# Patient Record
Sex: MALE | Race: WHITE | URBAN - METROPOLITAN AREA
[De-identification: names, ages, dates, MRNs, and addresses within clinical notes are randomized per-mention and may not be internally consistent; named-entity substitution may affect disease eponyms.]

---

## 2019-03-25 ENCOUNTER — TRANSFERRED RECORDS (OUTPATIENT)
Dept: HEALTH INFORMATION MANAGEMENT | Facility: CLINIC | Age: 59
End: 2019-03-25

## 2019-03-28 ENCOUNTER — MEDICAL CORRESPONDENCE (OUTPATIENT)
Dept: HEALTH INFORMATION MANAGEMENT | Facility: CLINIC | Age: 59
End: 2019-03-28

## 2019-04-09 ENCOUNTER — DOCUMENTATION ONLY (OUTPATIENT)
Dept: CARE COORDINATION | Facility: CLINIC | Age: 59
End: 2019-04-09

## 2019-04-17 NOTE — TELEPHONE ENCOUNTER
RECORDS RECEIVED FROM: PAINFUL L MARCELLO PER MANDIE, REFERRED BY DR LUDWIG @ [facility] IN [place]   DATE RECEIVED: Apr 25, 2019    NOTES STATUS DETAILS   OFFICE NOTE from referring provider Care Everywhere Dr. Ludwig 3/25/19...   OFFICE NOTE from other specialist N/A    DISCHARGE SUMMARY from hospital N/A    DISCHARGE REPORT from the ER N/A    OPERATIVE REPORT In process L MARCELLO 2001 [facility]  Revision L MARCELLO 2004   MEDICATION LIST Care Everywhere    IMPLANT RECORD/STICKER In process    LABS     CBC/DIFF N/A    CULTURES N/A    INJECTIONS DONE IN RADIOLOGY N/A    MRI N/A    CT SCAN Received 9/18/18...    XRAYS (IMAGES & REPORTS) Received 3/25/19   TUMOR     PATHOLOGY  Slides & report N/A      04/17/19   11:02 AM  Faxed request for CT to [phone]  11:09 AM faxed request for XR from [facility].   11:13 AM Faxed request to [facility] for records    04/19/19   8:33 AM  Spoke with medical records at [facility], the pt's CT was sent 4/18 via [email or web address]    04/22/19   1:31 PM  Disc arrived, will be uploaded.     04/23/19   3:56 PM  Emailed [facility] for records    04/24/19   4:16 PM  [facility] refusing to send records without signed release. Put MARTHA for patient to sign in Brigette's mailbox.

## 2019-04-25 ENCOUNTER — OFFICE VISIT (OUTPATIENT)
Dept: ORTHOPEDICS | Facility: CLINIC | Age: 59
End: 2019-04-25
Payer: MEDICARE

## 2019-04-25 ENCOUNTER — PRE VISIT (OUTPATIENT)
Dept: ORTHOPEDICS | Facility: CLINIC | Age: 59
End: 2019-04-25

## 2019-04-25 VITALS — HEIGHT: 68 IN | WEIGHT: 210.5 LBS | BODY MASS INDEX: 31.9 KG/M2

## 2019-04-25 DIAGNOSIS — M25.552 HIP PAIN, LEFT: ICD-10-CM

## 2019-04-25 DIAGNOSIS — M25.552 HIP PAIN, LEFT: Primary | ICD-10-CM

## 2019-04-25 LAB
CRP SERPL-MCNC: <2.9 MG/L (ref 0–8)
ERYTHROCYTE [SEDIMENTATION RATE] IN BLOOD BY WESTERGREN METHOD: 6 MM/H (ref 0–20)

## 2019-04-25 RX ORDER — PREDNISONE 20 MG/1
40 TABLET ORAL
COMMUNITY
Start: 2019-04-23 | End: 2019-04-28

## 2019-04-25 RX ORDER — ALBUTEROL SULFATE 90 UG/1
2 AEROSOL, METERED RESPIRATORY (INHALATION)
COMMUNITY
Start: 2019-03-20

## 2019-04-25 RX ORDER — ASPIRIN 81 MG/1
81 TABLET ORAL
COMMUNITY
Start: 2015-01-28

## 2019-04-25 RX ORDER — TAMSULOSIN HYDROCHLORIDE 0.4 MG/1
0.4 CAPSULE ORAL
COMMUNITY
Start: 2018-09-10

## 2019-04-25 RX ORDER — AZITHROMYCIN 250 MG/1
TABLET, FILM COATED ORAL
COMMUNITY
Start: 2019-04-23 | End: 2019-04-28

## 2019-04-25 RX ORDER — SILDENAFIL CITRATE 20 MG/1
TABLET ORAL
COMMUNITY
Start: 2018-09-10

## 2019-04-25 ASSESSMENT — ENCOUNTER SYMPTOMS
NECK MASS: 0
COUGH: 1
HEMOPTYSIS: 0
WHEEZING: 1
DYSURIA: 0
POSTURAL DYSPNEA: 1
JOINT SWELLING: 0
FLANK PAIN: 0
DYSPNEA ON EXERTION: 0
PARALYSIS: 0
SEIZURES: 0
BACK PAIN: 1
NECK PAIN: 1
MUSCLE CRAMPS: 1
MYALGIAS: 1
LOSS OF CONSCIOUSNESS: 0
DIFFICULTY URINATING: 1
HOARSE VOICE: 0
DISTURBANCES IN COORDINATION: 1
STIFFNESS: 1
SINUS CONGESTION: 1
NUMBNESS: 1
MEMORY LOSS: 0
SPEECH CHANGE: 0
SMELL DISTURBANCE: 0
COUGH DISTURBING SLEEP: 1
MUSCLE WEAKNESS: 1
HEMATURIA: 0
HEADACHES: 1
TROUBLE SWALLOWING: 0
SPUTUM PRODUCTION: 1
ARTHRALGIAS: 1
TASTE DISTURBANCE: 0
TREMORS: 0
DIZZINESS: 1
SNORES LOUDLY: 0
SHORTNESS OF BREATH: 0
SORE THROAT: 0
WEAKNESS: 0
TINGLING: 1
SINUS PAIN: 0

## 2019-04-25 ASSESSMENT — HOOS S4: HOW SEVERE IS YOUR HIP JOINT STIFFNESS AFTER FIRST WAKENING IN THE MORNING?: SEVERE

## 2019-04-25 ASSESSMENT — ACTIVITIES OF DAILY LIVING (ADL)
ADL_SUM: 42
ADL_MEAN: 2.47
ADL_SUBSCALE_SCORE: 38.23

## 2019-04-25 ASSESSMENT — MIFFLIN-ST. JEOR: SCORE: 1726.38

## 2019-04-25 NOTE — PROGRESS NOTES
Attending MD (Dr. Ziggy Chacon) Attestation :  This patient was seen and evaluated by me including a history, exam, and interpretation of all imaging and/or lab data.  Either a training physician (resident/fellow), who also saw the patient, or scribe has documented the visit in the attached note.    MD Matthew Black Family Professor  Oncology and Adult Reconstructive Surgery  Dept Orthopaedic Surgery, [facility]  [phone] office, [phone] pager  [email or web address], Orthopaedic Oncology Surgery Consultation  by Ziggy Chacon M.D.    Ricardo Feng MRN# [id number]   Age: 59 year old YOB: 1960     Requesting physician: Jasmeet Jones  No primary care provider on file.       Background history:  DX:  1. Left hip osteoarthritis  2. Right hip osteoarthritis    TREATMENTS:  1. 2001, Left Total Hip Arthroplasty, (Dr. Johnston), [facility]  2. 2002, Right Total Hip Arthroplasty, (Dr. Johnston), [facility]  3. 2003, Revision Left Total Hip arthroplasty, [facility]  4. 2005, Revision Right Total Hip arthroplasty, [facility]  5. 9/29/2010, posterior C7-T1 decompression and fusion, (Tim Fuchs MD), [facility]  6. 9/26/2011, left L3-L4 decompressive and von-laminectomy and discectomy, (Tim Fuchs MD), [facility]  7. 7/24/2014, C4-C5 ACDF, (Tim Fuchs MD), [facility]             History of Present Illness:   59 year old male who presents to clinic today with left hip pain.  Patient notes that he has had revision total hip arthroplasty on the left side.  He did well ever since 2003 up until roughly a year ago when he started noticing pain.  He has pain in the groin.  This pain feels like the hip slipping on the place.  He is no longer able to do the activities that she would like to do because of the hip pain.  Notices pain at night occasionally.  Denies any numbness and tingling.  Pain does not radiate below his knee.  He is instant having  "revision surgery at this time.  Reports weakness and slight decrease in sensation below his nipple line secondary to spinal cord injury in 2011.  Smokes half pack per day but is planning on quitting.  Nondiabetic, no history of DVT, no anticoagulant medication, BMI is 32.5.  He works part-time at a [employer].  Is also associated disability for spinal cord injury.    Current symptoms:  Problem: Left hip pain  Onset and duration: 1 year  Awakens from sleep due to sx's:  Yes  Precipitating Injury:  Yes    Other joints or sites painful:  Yes  Fever: Yes  Appetite change or weight loss: Yes         Physical Exam:     EXAMINATION pertinent findings:   VITAL SIGNS: Height 1.715 m (5' 7.5\"), weight 95.5 kg (210 lb 8 oz).  Body mass index is 32.48 kg/m .  RESP: non labored breathing   ABD: benign   SKIN: grossly normal   LYMPHATIC: grossly normal   NEURO: grossly normal   VASCULAR: satisfactory perfusion of all extremities   MUSCULOSKELETAL:   Exam focused in the left lower extremity.  Palpable dorsalis pedis.  Present sensation but patient reports that it is decreased.  Fires EHL, FHL, ankle flexion and plantar flexion 5 out of 5 strength.  His prior incisions well-healed clean dry and intact.  Pain with internal and external rotation of the hip.  Hip flexion 90 degrees with minimal discomfort.             Data:   All laboratory data reviewed  All imaging studies reviewed by me  AP of the pelvis is reviewed.  Implants do not show signs of loosening.  There is eccentric wear on the left polyethylene and possible eccentric wear on the right polyethylene.  No new fracture or dislocation seen          Assessment and Plan:   Assessment:  59-year-old male with history of a revision left and right total hip arthroplasties, presents clinic today with left hip pain in his subjective feeling of instability.  We discussed with the patient that the eccentric wear of the polyethylene should not be contributing to symptoms she is describing.  " We discussed that would like to get some blood work to determine if there is an infection and/or metal debris, which could be causing symptoms.  We will also order an MRI to look for fluid collection.  If there is a fluid collection we will aspirate.     Plan:  -Blood work: ESR, CRP, cobalt and chromium levels  -MRI of the left hip  -We will follow-up with the patient's regarding his test results      Attending MD (Dr. Ziggy Chacon) Attestation :  This patient was seen and evaluated by me including a history, exam, and interpretation of all imaging and/or lab data.  Either a training physician (resident/fellow), who also saw the patient, or scribe has documented the clinic visit in the attached note.    Ziggy Chacon MD  [facility] Family Professor  Oncology and Adult Reconstructive Surgery  Dept Orthopaedic Surgery, [facility]  [phone] office, [phone] pager  [email or web address]          DATA for DOCUMENTATION:         Past Medical History:     Patient Active Problem List   Diagnosis     BPH (benign prostatic hyperplasia)     Cervical stenosis of spine     Erectile dysfunction     Fixation hardware in spine     Status post lumbar discectomy     Hyperlipidemia     Impaired fasting blood sugar     Neurogenic bladder     Prostate cancer screening     S/P cervical spinal fusion     Sinusitis, chronic     Past Medical History:   Diagnosis Date     Arthritis      Back injury      Chronic osteoarthritis      Degenerative joint disease      Hearing problem      Neck injuries        Also see scanned health assessment forms.       Past Surgical History:     Past Surgical History:   Procedure Laterality Date     BACK SURGERY       C HAND/FINGER SURGERY UNLISTED       C PELVIS/HIP JOINT SURGERY UNLISTED       HC SACROPLASTY       NECK SURGERY              Social History:     Social History     Socioeconomic History     Marital status:      Spouse name: Not on file     Number of children: Not on file      Years of education: Not on file     Highest education level: Not on file   Occupational History     Not on file   Social Needs     Financial resource strain: Not on file     Food insecurity:     Worry: Not on file     Inability: Not on file     Transportation needs:     Medical: Not on file     Non-medical: Not on file   Tobacco Use     Smoking status: Light Tobacco Smoker     Packs/day: 0.50     Years: 40.00     Pack years: 20.00     Types: Cigarettes     Start date: 1/1/1980     Smokeless tobacco: Never Used   Substance and Sexual Activity     Alcohol use: Yes     Drug use: Not Currently     Types: Marijuana     Sexual activity: Yes     Partners: Female     Birth control/protection: None   Lifestyle     Physical activity:     Days per week: Not on file     Minutes per session: Not on file     Stress: Not on file   Relationships     Social connections:     Talks on phone: Not on file     Gets together: Not on file     Attends Restorationist service: Not on file     Active member of club or organization: Not on file     Attends meetings of clubs or organizations: Not on file     Relationship status: Not on file     Intimate partner violence:     Fear of current or ex partner: Not on file     Emotionally abused: Not on file     Physically abused: Not on file     Forced sexual activity: Not on file   Other Topics Concern     Not on file   Social History Narrative     Not on file            Family History:       Family History   Problem Relation Age of Onset     Prostate Cancer Father             Medications:     Current Outpatient Medications   Medication Sig     albuterol (VENTOLIN HFA) 108 (90 Base) MCG/ACT inhaler Inhale 2 puffs into the lungs     ALBUTEROL SULFATE HFA IN      aspirin 81 MG EC tablet Take 81 mg by mouth     azithromycin (ZITHROMAX) 250 MG tablet      predniSONE (DELTASONE) 20 MG tablet Take 40 mg by mouth.     sildenafil (REVATIO) 20 MG tablet 3-5 tablets 1 hour prior to sexual activity     tamsulosin  (FLOMAX) 0.4 MG capsule Take 0.4 mg by mouth     No current facility-administered medications for this visit.               Review of Systems:   A comprehensive 10 point review of systems (constitutional, ENT, cardiac, peripheral vascular, lymphatic, respiratory, GI, , Musculoskeletal, skin, Neurological) was performed and found to be negative except as described in this note.     See intake form completed by patient

## 2019-04-25 NOTE — NURSING NOTE
"Chief Complaint   Patient presents with     Consult     Pt. states that he is here today for Painful Left Total Hip Replacement.        59 year old  1960    Ht 1.715 m (5' 7.5\")   Wt 95.5 kg (210 lb 8 oz)   BMI 32.48 kg/m          Date/Surgery/Surgeon/Hospital:  1. 2001, Left Total Hip Arthroplasty, [facility]  2. 2002, Right Total Hip Arthroplasty, [facility]  3. 2003, Revision Left Total Hip, [facility]  4. 2005, Revision Right Total Hip [facility]         [facility] - [place], [place] - [address]    Allergies   Allergen Reactions     Hydrocodone Hives and Itching     Hydrocodone-Acetaminophen Hives and Itching     Morphine Nausea and Vomiting     Other reaction(s): GI intolerance       Current Outpatient Medications   Medication     albuterol (VENTOLIN HFA) 108 (90 Base) MCG/ACT inhaler     ALBUTEROL SULFATE HFA IN     aspirin 81 MG EC tablet     azithromycin (ZITHROMAX) 250 MG tablet     predniSONE (DELTASONE) 20 MG tablet     sildenafil (REVATIO) 20 MG tablet     tamsulosin (FLOMAX) 0.4 MG capsule     No current facility-administered medications for this visit.          Questionnaires:    HOOS Hip Dysfunction & Osteoarthritis Outcome Questionnaire    Hip Dysfunction & Osteoarthritis Outcome Score (HOOS), English Version LK 2.0 (Griselda CANO, Shell E, Dalton MONK, 2003) 4/25/2019   S1. Do you feel grinding, hear clicking or any other type of noise from your hip? Always   S2. Difficulties spreading legs wide apart Moderate   S3. Difficulties to stride out when walking Severe   S4. How severe is your hip joint stiffness after first wakening in the morning? Severe   S5. How severe is your hip stiffness after sitting, lying or resting LATER IN THE DAY? Severe   Symptom Count 5   Symptom Sum 15   Symptom Mean 3   Symptom Subscale Score 25   P1. How often is your hip painful? Always   P2. Straightening your hip fully None   P3. Bending your hip FULLY Extreme   P4. Walking on a flat surface Moderate   P5. Going up or down stairs " Extreme   P6. At night while in bed Moderate   P7. Sitting or lying Mild   P8. Standing upright None   P9. Walking on a hard surface (asphalt, concrete, etc.) Moderate   P10. Walking on an uneven surface Extreme   Pain Count 10   Pain Sum 23   Pain Mean 2.3   Pain Subscale Score 42.5   A1. Descending stairs Extreme   A2. Ascending stairs Extreme   A3. Rising from sitting Severe   A4. Standing None   A5. Bending to the floor/ an object Extreme   A6. Walking on a flat surface Moderate   A7. Getting in/out of car Severe   A8. Going shopping Moderate   A9. Putting on socks/stockings Extreme   A10. Rising from bed Moderate   A11. Taking off socks/stockings Extreme   A12. Lying in bed (turning over, maintaining hip position) Severe   A13. Getting in/out of bed Moderate   A14. Sitting None   A15. Getting on/off toilet Mild   A16. Heavy domestic duties (moving heavy boxes, scrubbing floors, etc.) Extreme   A17. Light domestic duties (cooking, dusting, etc.) None   ADL Count 17   ADL Sum 42   ADL Mean 2.47   ADL Subscale Score 38.23   SP1. Squatting Extreme   SP2. Running Extreme   SP3. Twisting/pivoting on loaded leg Extreme   SP4. Walking on uneven surface Extreme   Sports/Rec Count 4   Sports/Rec Sum 16   Sports/Rec Mean 4   Sports/Rec Subscale Score 0   Q1. How often are you aware of your hip problem? Constantly   Q2. Have you modified you life style to avoid activities potentially damaging to your hip? Totally   Q3. How much are you troubled with lack of confidence in your hip? Extremely   Q4. In general, how much difficulty do you have with your hip? Extreme   QOL Count 4   QOL Sum 16   QOL Mean 4   Quality of Life Subscale Score 0              KOOS Knee Survey Assessment    No flowsheet data found.           Promis 10 Assessment    PROMIS 10 4/25/2019   In general, would you say your health is: Very good   In general, would you say your quality of life is: Excellent   In general, how would you rate your  physical health? Very good   In general, how would you rate your mental health, including your mood and your ability to think? Excellent   In general, how would you rate your satisfaction with your social activities and relationships? Excellent   In general, please rate how well you carry out your usual social activities and roles Very good   To what extent are you able to carry out your everyday physical activities such as walking, climbing stairs, carrying groceries, or moving a chair? Moderately   How often have you been bothered by emotional problems such as feeling anxious, depressed or irritable? Rarely   How would you rate your fatigue on average? Mild   How would you rate your pain on average?   0 = No Pain  to  10 = Worst Imaginable Pain 8   In general, would you say your health is: 4   In general, would you say your quality of life is: 5   In general, how would you rate your physical health? 4   In general, how would you rate your mental health, including your mood and your ability to think? 5   In general, how would you rate your satisfaction with your social activities and relationships? 5   In general, please rate how well you carry out your usual social activities and roles. (This includes activities at home, at work and in your community, and responsibilities as a parent, child, spouse, employee, friend, etc.) 4   To what extent are you able to carry out your everyday physical activities such as walking, climbing stairs, carrying groceries, or moving a chair? 3   In the past 7 days, how often have you been bothered by emotional problems such as feeling anxious, depressed, or irritable? 2   In the past 7 days, how would you rate your fatigue on average? 2   In the past 7 days, how would you rate your pain on average, where 0 means no pain, and 10 means worst imaginable pain? 8   Global Mental Health Score 19   Global Physical Health Score 13   PROMIS TOTAL - SUBSCORES 32   Some recent data might be  hidden              Ortho [place] Knee Questionnaire    No flowsheet data found.

## 2019-04-25 NOTE — LETTER
4/25/2019       RE: Ricardo Feng  [address] [facility] [address]     Dear Colleague,    Thank you for referring your patient, Ricardo Feng, to the [facility]. Please see a copy of my visit note below.        [facility], Orthopaedic Oncology Surgery Consultation  by Ziggy Chacon M.D.    Ricardo Feng MRN# [id number]   Age: 59 year old YOB: 1960     Requesting physician: Jasmeet Jones  No primary care provider on file.       Background history:  DX:  1. Left hip osteoarthritis  2. Right hip osteoarthritis    TREATMENTS:  1. 2001, Left Total Hip Arthroplasty, (Dr. Johnston), [facility] [place]  2. 2002, Right Total Hip Arthroplasty, (Dr. Johnston), [facility]  3. 2003, Revision Left Total Hip arthroplasty, [facility]  4. 2005, Revision Right Total Hip arthroplasty, [facility]  5. 9/29/2010, posterior C7-T1 decompression and fusion, (Tim Fuchs MD), [facility] [place]  6. 9/26/2011, left L3-L4 decompressive and von-laminectomy and discectomy, (Tim Fuchs MD), [facility] [place]  7. 7/24/2014, C4-C5 ACDF, (Tim Fuchs MD), [facility] [place]             History of Present Illness:   59 year old male who presents to clinic today with left hip pain.  Patient notes that he has had revision total hip arthroplasty on the left side.  He did well ever since 2003 up until roughly a year ago when he started noticing pain.  He has pain in the groin.  This pain feels like the hip slipping on the place.  He is no longer able to do the activities that she would like to do because of the hip pain.  Notices pain at night occasionally.  Denies any numbness and tingling.  Pain does not radiate below his knee.  He is instant having revision surgery at this time.  Reports weakness and slight decrease in sensation below his nipple line secondary to spinal cord injury in 2011.  Smokes half pack per day but is planning on quitting.   "Nondiabetic, no history of DVT, no anticoagulant medication, BMI is 32.5.  He works part-time at a [employer].  Is also associated disability for spinal cord injury.    Current symptoms:  Problem: Left hip pain  Onset and duration: 1 year  Awakens from sleep due to sx's:  Yes  Precipitating Injury:  Yes    Other joints or sites painful:  Yes  Fever: Yes  Appetite change or weight loss: Yes         Physical Exam:     EXAMINATION pertinent findings:   VITAL SIGNS: Height 1.715 m (5' 7.5\"), weight 95.5 kg (210 lb 8 oz).  Body mass index is 32.48 kg/m .  RESP: non labored breathing   ABD: benign   SKIN: grossly normal   LYMPHATIC: grossly normal   NEURO: grossly normal   VASCULAR: satisfactory perfusion of all extremities   MUSCULOSKELETAL:   Exam focused in the left lower extremity.  Palpable dorsalis pedis.  Present sensation but patient reports that it is decreased.  Fires EHL, FHL, ankle flexion and plantar flexion 5 out of 5 strength.  His prior incisions well-healed clean dry and intact.  Pain with internal and external rotation of the hip.  Hip flexion 90 degrees with minimal discomfort.           Data:   All laboratory data reviewed  All imaging studies reviewed by me  AP of the pelvis is reviewed.  Implants do not show signs of loosening.  There is eccentric wear on the left polyethylene and possible eccentric wear on the right polyethylene.  No new fracture or dislocation seen          Assessment and Plan:   Assessment:  59-year-old male with history of a revision left and right total hip arthroplasties, presents clinic today with left hip pain in his subjective feeling of instability.  We discussed with the patient that the eccentric wear of the polyethylene should not be contributing to symptoms she is describing.  We discussed that would like to get some blood work to determine if there is an infection and/or metal debris, which could be causing symptoms.  We will also order an MRI to look for fluid collection.  " If there is a fluid collection we will aspirate.     Plan:  -Blood work: ESR, CRP, cobalt and chromium levels  -MRI of the left hip  -We will follow-up with the patient's regarding his test results    Attending MD (Dr. Ziggy Chacon) Attestation :  This patient was seen and evaluated by me including a history, exam, and interpretation of all imaging and/or lab data.  Either a training physician (resident/fellow), who also saw the patient, or scribe has documented the clinic visit in the attached note.    Ziggy Chacon MD  [facility] Family Professor  Oncology and Adult Reconstructive Surgery  Dept Orthopaedic Surgery, [facility]  [phone] office, [phone] pager  [email or web address]    DATA for DOCUMENTATION:         Past Medical History:     Patient Active Problem List   Diagnosis     BPH (benign prostatic hyperplasia)     Cervical stenosis of spine     Erectile dysfunction     Fixation hardware in spine     Status post lumbar discectomy     Hyperlipidemia     Impaired fasting blood sugar     Neurogenic bladder     Prostate cancer screening     S/P cervical spinal fusion     Sinusitis, chronic     Past Medical History:   Diagnosis Date     Arthritis      Back injury      Chronic osteoarthritis      Degenerative joint disease      Hearing problem      Neck injuries      Also see scanned health assessment forms.       Past Surgical History:     Past Surgical History:   Procedure Laterality Date     BACK SURGERY       C HAND/FINGER SURGERY UNLISTED       C PELVIS/HIP JOINT SURGERY UNLISTED       HC SACROPLASTY       NECK SURGERY              Social History:     Social History     Socioeconomic History     Marital status:      Spouse name: Not on file     Number of children: Not on file     Years of education: Not on file     Highest education level: Not on file   Occupational History     Not on file   Social Needs     Financial resource strain: Not on file     Food insecurity:     Worry: Not on file      Inability: Not on file     Transportation needs:     Medical: Not on file     Non-medical: Not on file   Tobacco Use     Smoking status: Light Tobacco Smoker     Packs/day: 0.50     Years: 40.00     Pack years: 20.00     Types: Cigarettes     Start date: 1/1/1980     Smokeless tobacco: Never Used   Substance and Sexual Activity     Alcohol use: Yes     Drug use: Not Currently     Types: Marijuana     Sexual activity: Yes     Partners: Female     Birth control/protection: None   Lifestyle     Physical activity:     Days per week: Not on file     Minutes per session: Not on file     Stress: Not on file   Relationships     Social connections:     Talks on phone: Not on file     Gets together: Not on file     Attends Jain service: Not on file     Active member of club or organization: Not on file     Attends meetings of clubs or organizations: Not on file     Relationship status: Not on file     Intimate partner violence:     Fear of current or ex partner: Not on file     Emotionally abused: Not on file     Physically abused: Not on file     Forced sexual activity: Not on file   Other Topics Concern     Not on file   Social History Narrative     Not on file           Family History:     Family History   Problem Relation Age of Onset     Prostate Cancer Father             Medications:     Current Outpatient Medications   Medication Sig     albuterol (VENTOLIN HFA) 108 (90 Base) MCG/ACT inhaler Inhale 2 puffs into the lungs     ALBUTEROL SULFATE HFA IN      aspirin 81 MG EC tablet Take 81 mg by mouth     azithromycin (ZITHROMAX) 250 MG tablet      predniSONE (DELTASONE) 20 MG tablet Take 40 mg by mouth.     sildenafil (REVATIO) 20 MG tablet 3-5 tablets 1 hour prior to sexual activity     tamsulosin (FLOMAX) 0.4 MG capsule Take 0.4 mg by mouth     No current facility-administered medications for this visit.             Review of Systems:   A comprehensive 10 point review of systems (constitutional, ENT, cardiac,  peripheral vascular, lymphatic, respiratory, GI, , Musculoskeletal, skin, Neurological) was performed and found to be negative except as described in this note.     See intake form completed by patient

## 2019-04-29 LAB
COBALT SERPL-MCNC: <1 UG/L
CR SERPL-MCNC: <1 UG/L

## 2019-10-01 ENCOUNTER — HEALTH MAINTENANCE LETTER (OUTPATIENT)
Age: 59
End: 2019-10-01

## 2019-12-15 ENCOUNTER — HEALTH MAINTENANCE LETTER (OUTPATIENT)
Age: 59
End: 2019-12-15

## 2021-01-15 ENCOUNTER — HEALTH MAINTENANCE LETTER (OUTPATIENT)
Age: 61
End: 2021-01-15

## 2021-09-04 ENCOUNTER — HEALTH MAINTENANCE LETTER (OUTPATIENT)
Age: 61
End: 2021-09-04

## 2022-02-19 ENCOUNTER — HEALTH MAINTENANCE LETTER (OUTPATIENT)
Age: 62
End: 2022-02-19

## 2022-10-22 ENCOUNTER — HEALTH MAINTENANCE LETTER (OUTPATIENT)
Age: 62
End: 2022-10-22

## 2023-04-01 ENCOUNTER — HEALTH MAINTENANCE LETTER (OUTPATIENT)
Age: 63
End: 2023-04-01

## 2023-06-01 ENCOUNTER — HEALTH MAINTENANCE LETTER (OUTPATIENT)
Age: 63
End: 2023-06-01